# Patient Record
Sex: MALE | Race: WHITE | NOT HISPANIC OR LATINO | Employment: FULL TIME | URBAN - METROPOLITAN AREA
[De-identification: names, ages, dates, MRNs, and addresses within clinical notes are randomized per-mention and may not be internally consistent; named-entity substitution may affect disease eponyms.]

---

## 2022-06-27 ENCOUNTER — OFFICE VISIT (OUTPATIENT)
Dept: URGENT CARE | Facility: CLINIC | Age: 37
End: 2022-06-27
Payer: COMMERCIAL

## 2022-06-27 VITALS
WEIGHT: 228 LBS | DIASTOLIC BLOOD PRESSURE: 98 MMHG | HEART RATE: 111 BPM | OXYGEN SATURATION: 96 % | RESPIRATION RATE: 18 BRPM | TEMPERATURE: 99 F | HEIGHT: 70 IN | SYSTOLIC BLOOD PRESSURE: 151 MMHG | BODY MASS INDEX: 32.64 KG/M2

## 2022-06-27 DIAGNOSIS — M79.605 LEFT LEG PAIN: Primary | ICD-10-CM

## 2022-06-27 DIAGNOSIS — S86.912A MUSCLE STRAIN OF LEFT LOWER LEG, INITIAL ENCOUNTER: ICD-10-CM

## 2022-06-27 PROCEDURE — 3077F SYST BP >= 140 MM HG: CPT | Mod: CPTII,,, | Performed by: FAMILY MEDICINE

## 2022-06-27 PROCEDURE — 1159F PR MEDICATION LIST DOCUMENTED IN MEDICAL RECORD: ICD-10-PCS | Mod: CPTII,,, | Performed by: FAMILY MEDICINE

## 2022-06-27 PROCEDURE — 3080F PR MOST RECENT DIASTOLIC BLOOD PRESSURE >= 90 MM HG: ICD-10-PCS | Mod: CPTII,,, | Performed by: FAMILY MEDICINE

## 2022-06-27 PROCEDURE — 3077F PR MOST RECENT SYSTOLIC BLOOD PRESSURE >= 140 MM HG: ICD-10-PCS | Mod: CPTII,,, | Performed by: FAMILY MEDICINE

## 2022-06-27 PROCEDURE — 1160F PR REVIEW ALL MEDS BY PRESCRIBER/CLIN PHARMACIST DOCUMENTED: ICD-10-PCS | Mod: CPTII,,, | Performed by: FAMILY MEDICINE

## 2022-06-27 PROCEDURE — 3080F DIAST BP >= 90 MM HG: CPT | Mod: CPTII,,, | Performed by: FAMILY MEDICINE

## 2022-06-27 PROCEDURE — 99203 PR OFFICE/OUTPT VISIT, NEW, LEVL III, 30-44 MIN: ICD-10-PCS | Mod: ,,, | Performed by: FAMILY MEDICINE

## 2022-06-27 PROCEDURE — 1159F MED LIST DOCD IN RCRD: CPT | Mod: CPTII,,, | Performed by: FAMILY MEDICINE

## 2022-06-27 PROCEDURE — 3008F BODY MASS INDEX DOCD: CPT | Mod: CPTII,,, | Performed by: FAMILY MEDICINE

## 2022-06-27 PROCEDURE — 1160F RVW MEDS BY RX/DR IN RCRD: CPT | Mod: CPTII,,, | Performed by: FAMILY MEDICINE

## 2022-06-27 PROCEDURE — 99203 OFFICE O/P NEW LOW 30 MIN: CPT | Mod: ,,, | Performed by: FAMILY MEDICINE

## 2022-06-27 PROCEDURE — 3008F PR BODY MASS INDEX (BMI) DOCUMENTED: ICD-10-PCS | Mod: CPTII,,, | Performed by: FAMILY MEDICINE

## 2022-06-27 NOTE — PATIENT INSTRUCTIONS
Likely a leg muscle strain.  Recommend rest ice and compression.  Ice 3 to 4 times a day for 10-15 minutes.  Take Motrin or Tylenol as needed.  If no improvement in a week notify this clinic we will refer you to orthopedics.  Recommend avoiding any strenuous activities or running for 1 week as well.  
9

## 2022-06-27 NOTE — PROGRESS NOTES
"Subjective:       Patient ID: Dylan Archer is a 36 y.o. male.    Vitals:  height is 5' 10" (1.778 m) and weight is 103.4 kg (228 lb). His oral temperature is 98.7 °F (37.1 °C). His blood pressure is 151/98 (abnormal) and his pulse is 111 (abnormal). His respiration is 18 and oxygen saturation is 96%.     Chief Complaint: Leg Pain (Left lower leg pain x this morning. Pt states he was sitting on brick wall and when he jumped down he hurt his leg. )    36 y.o. male presents to clinic c/o left lower leg pain, numbness, tingling x this morning. Pt states he was sitting on brick wall and when he jumped down he hurt his leg.  States the fall was about 6 ft tall.  States he is having pain with weight-bearing and ambulation.  States there was some swelling initially but that has somewhat improved.  denies any ankle or knee pain    Leg Pain   The incident occurred 12 to 24 hours ago. The pain is present in the left leg. The pain is at a severity of 7/10. The pain is moderate. The pain has been constant since onset. Associated symptoms include an inability to bear weight, numbness and tingling. He reports no foreign bodies present. The symptoms are aggravated by weight bearing.       Constitution: Negative.   HENT: Negative.    Neck: neck negative.   Cardiovascular: Negative.    Eyes: Negative.    Respiratory: Negative.    Gastrointestinal: Negative.    Genitourinary: Negative.    Musculoskeletal: Negative.    Skin: Negative.    Allergic/Immunologic: Negative.    Neurological: Positive for numbness.   Hematologic/Lymphatic: Negative.        Objective:      Physical Exam   HENT:   Head: Normocephalic and atraumatic.   Abdominal: Normal appearance.   Musculoskeletal:         General: Tenderness (He left mid-shin anterolaterally.  No bruising or swelling observed. negative Alba test) present.   Neurological: He is alert.   Left lower leg wrapped in an Ace bandage     Previous History      Review of patient's allergies " "indicates:  No Known Allergies    History reviewed. No pertinent past medical history.  No current outpatient medications  Past Surgical History:   Procedure Laterality Date    arm surgery Right      History reviewed. No pertinent family history.    Social History     Tobacco Use    Smoking status: Smoker, Current Status Unknown     Types: Cigarettes    Smokeless tobacco: Never Used   Substance Use Topics    Alcohol use: Yes     Comment: few times per week    Drug use: Yes     Types: Marijuana     Comment: prescribed marijuana        Physical Exam      Vital Signs Reviewed   BP (!) 151/98 (BP Location: Left arm, Patient Position: Sitting)   Pulse (!) 111   Temp 98.7 °F (37.1 °C) (Oral)   Resp 18   Ht 5' 10" (1.778 m)   Wt 103.4 kg (228 lb)   SpO2 96%   BMI 32.71 kg/m²        Procedures    Procedures     Labs   No results found for this or any previous visit.      Assessment:       1. Left leg pain    2. Muscle strain of left lower leg, initial encounter          Plan:         Likely a leg muscle strain.  Recommend rest ice and compression.  Ice 3 to 4 times a day for 10-15 minutes.  Take Motrin or Tylenol as needed.  If no improvement in a week notify this clinic we will refer you to orthopedics.  Recommend avoiding any strenuous activities or running for 1 week as well.    X-ray negative.  This is a preliminary read waiting on official report from Radiology  Left leg pain  -     XR TIBIA FIBULA 2 VIEW LEFT; Future; Expected date: 06/27/2022    Muscle strain of left lower leg, initial encounter                     "